# Patient Record
Sex: FEMALE | Race: WHITE | Employment: UNEMPLOYED | URBAN - METROPOLITAN AREA
[De-identification: names, ages, dates, MRNs, and addresses within clinical notes are randomized per-mention and may not be internally consistent; named-entity substitution may affect disease eponyms.]

---

## 2020-01-07 PROCEDURE — 99285 EMERGENCY DEPT VISIT HI MDM: CPT | Performed by: EMERGENCY MEDICINE

## 2020-04-07 LAB
APPEARANCE, URINE: CLEAR
BACTERIA, URINE: NORMAL
BILIRUBIN-URINE: ABNORMAL
COLOR, URINE: YELLOW
GLUCOSE URINE-UA: ABNORMAL
KETONE-URINE: ABNORMAL
NITRITE-URINE: ABNORMAL
NO CASTS, URINE: NORMAL
NO CRYSTALS, URINE: NORMAL
OCCULT BLOOD URINE: ABNORMAL
PH-URINE: 7 (ref 5–8)
PROTEIN-URINE-DIP: ABNORMAL
RBC-URINE: NORMAL /HPF (ref 0–2)
SPECIFIC GRAVITY-URINE: <=1.005 (ref 1.01–1.03)
SQUAMOUS EPITHELIAL CELL URINE: NORMAL /LPF (ref 0–2)
URINE LEUKOCYTE ESTERASE: NEGATIVE
UROBILINOGEN-URINE: ABNORMAL MG/DL (ref 0.2–1)
WBC-URINE: NORMAL /HPF (ref 0–2)

## 2024-06-26 ENCOUNTER — APPOINTMENT (OUTPATIENT)
Dept: ULTRASOUND IMAGING | Age: 29
End: 2024-06-26

## 2024-06-26 ENCOUNTER — HOSPITAL ENCOUNTER (EMERGENCY)
Age: 29
Discharge: HOME | End: 2024-06-26
Attending: EMERGENCY MEDICINE

## 2024-06-26 VITALS
RESPIRATION RATE: 16 BRPM | TEMPERATURE: 98 F | DIASTOLIC BLOOD PRESSURE: 72 MMHG | HEART RATE: 91 BPM | WEIGHT: 175 LBS | HEIGHT: 66 IN | BODY MASS INDEX: 28.13 KG/M2 | OXYGEN SATURATION: 100 % | SYSTOLIC BLOOD PRESSURE: 118 MMHG

## 2024-06-26 DIAGNOSIS — O20.0 THREATENED MISCARRIAGE (HCC): Primary | ICD-10-CM

## 2024-06-26 LAB
B-HCG SERPL-ACNC: ABNORMAL MIU/ML
BASOPHILS # BLD AUTO: 0.06 X10(3) UL (ref 0–0.2)
BASOPHILS NFR BLD AUTO: 0.5 %
EOSINOPHIL # BLD AUTO: 0.13 X10(3) UL (ref 0–0.7)
EOSINOPHIL NFR BLD AUTO: 1.2 %
ERYTHROCYTE [DISTWIDTH] IN BLOOD BY AUTOMATED COUNT: 12.3 %
HCT VFR BLD AUTO: 43.2 %
HGB BLD-MCNC: 14.8 G/DL
IMM GRANULOCYTES # BLD AUTO: 0.05 X10(3) UL (ref 0–1)
IMM GRANULOCYTES NFR BLD: 0.4 %
LYMPHOCYTES # BLD AUTO: 2.77 X10(3) UL (ref 1–4)
LYMPHOCYTES NFR BLD AUTO: 24.8 %
MCH RBC QN AUTO: 30.9 PG (ref 26–34)
MCHC RBC AUTO-ENTMCNC: 34.3 G/DL (ref 31–37)
MCV RBC AUTO: 90.2 FL
MONOCYTES # BLD AUTO: 0.64 X10(3) UL (ref 0.1–1)
MONOCYTES NFR BLD AUTO: 5.7 %
NEUTROPHILS # BLD AUTO: 7.54 X10 (3) UL (ref 1.5–7.7)
NEUTROPHILS # BLD AUTO: 7.54 X10(3) UL (ref 1.5–7.7)
NEUTROPHILS NFR BLD AUTO: 67.4 %
PLATELET # BLD AUTO: 277 10(3)UL (ref 150–450)
RBC # BLD AUTO: 4.79 X10(6)UL
RH BLOOD TYPE: POSITIVE
WBC # BLD AUTO: 11.2 X10(3) UL (ref 4–11)

## 2024-06-26 PROCEDURE — 86900 BLOOD TYPING SEROLOGIC ABO: CPT | Performed by: EMERGENCY MEDICINE

## 2024-06-26 PROCEDURE — 99284 EMERGENCY DEPT VISIT MOD MDM: CPT

## 2024-06-26 PROCEDURE — 36415 COLL VENOUS BLD VENIPUNCTURE: CPT

## 2024-06-26 PROCEDURE — 99285 EMERGENCY DEPT VISIT HI MDM: CPT

## 2024-06-26 PROCEDURE — 86901 BLOOD TYPING SEROLOGIC RH(D): CPT | Performed by: EMERGENCY MEDICINE

## 2024-06-26 PROCEDURE — 85025 COMPLETE CBC W/AUTO DIFF WBC: CPT | Performed by: EMERGENCY MEDICINE

## 2024-06-26 PROCEDURE — 76801 OB US < 14 WKS SINGLE FETUS: CPT | Performed by: EMERGENCY MEDICINE

## 2024-06-26 PROCEDURE — 84702 CHORIONIC GONADOTROPIN TEST: CPT | Performed by: EMERGENCY MEDICINE

## 2024-06-26 PROCEDURE — 76817 TRANSVAGINAL US OBSTETRIC: CPT | Performed by: EMERGENCY MEDICINE

## 2024-06-26 NOTE — ED INITIAL ASSESSMENT (HPI)
Pt states she just found out she is 5 weeks pregnant and has an IUD, but she cannot feel it. Denies cramping, vag bleeding.

## 2024-06-27 NOTE — DISCHARGE INSTRUCTIONS
Push fluids and rest  Follow-up closely with gynecology on-call or your hometown gynecologist.  Call tomorrow for an appointment to be seen    No douching, tampons, or intercourse.  No heavy physical activity  Start over-the-counter prenatal vitamin

## 2024-06-27 NOTE — ED PROVIDER NOTES
Patient Seen in: [facility] In [place]      History     Chief Complaint   Patient presents with    Eval-G     Stated Complaint: PREGNANCY WITH IUD    Subjective:   HPI    Patient is G3, P2.  Patient discovered she is pregnant.  She took a few pregnancy test at home.  She has an IUD placed 3 years ago.  No vaginal bleeding.  No abdominal pain.  No cramping.  She has no gynecologist in the area and went to a midwife clinic in [facility]  Patient was having 60-day cycles.  Last period was May 1 and flow was as expected.  She had in period in April which was much heavier.     Objective:   History reviewed. No pertinent past medical history.           History reviewed. No pertinent surgical history.             Social History     Socioeconomic History    Marital status:    Tobacco Use    Smoking status: Never    Smokeless tobacco: Never   Vaping Use    Vaping status: Never Used   Substance and Sexual Activity    Alcohol use: Not Currently    Drug use: Never              Review of Systems    Positive for stated Chief Complaint: Eval-G    Other systems are as noted in HPI.  Constitutional and vital signs reviewed.      All other systems reviewed and negative except as noted above.    Physical Exam     ED Triage Vitals [06/26/24 1807]   /75   Pulse 95   Resp 19   Temp 98.3 °F (36.8 °C)   Temp src    SpO2 98 %   O2 Device None (Room air)       Current Vitals:   Vital Signs  BP: 113/75  Pulse: 95  Resp: 19  Temp: 98.3 °F (36.8 °C)    Oxygen Therapy  SpO2: 98 %  O2 Device: None (Room air)            Physical Exam  General: The patient is awake, alert, conversant.  She appears in no distress  Eyes: sclera white.  Lids and lashes are normal.  Abdomen: Soft, nondistended.  Completely nontender even to palpation.  Definitely no guarding, rigidity, rebound  Extremities: Unremarkable.    Skin: Not particularly pale  Neurologic:  Mental status as above.  Patient moves all extremities with good  strength and coordination.           ED Course     Labs Reviewed   HCG, BETA SUBUNIT (QUANT PREGNANCY TEST) - Abnormal; Notable for the following components:       Result Value    Hcg Quantitative 15,877.0 (*)     All other components within normal limits   CBC W/ DIFFERENTIAL - Abnormal; Notable for the following components:    WBC 11.2 (*)     All other components within normal limits   CBC WITH DIFFERENTIAL WITH PLATELET    Narrative:     The following orders were created for panel order CBC With Differential With Platelet.  Procedure                               Abnormality         Status                     ---------                               -----------         ------                     CBC W/ DIFFERENTIAL[[id number]]          Abnormal            Final result                 Please view results for these tests on the individual orders.   ABORH (BLOOD TYPE)                      MDM      Patient with positive pregnancy test at home and an IUD.  Ectopic pregnancy included in the differential.    CBC shows normal hemoglobin  Beta-hCG 15,877  Blood type O+    Ultrasound pelvis  I personally reviewed the actual radiographs themselves and my individual interpretation shows IUP without cardiac activity  radiologist's formal interpretation which I have reviewed    CONCLUSION:    1. There is an intrauterine gestational sac with a yolk sac and somewhat amorphous appearing fetal pole.  No fetal heart tones are yet identified .  The estimated age is 6 week 3 day +/-4 days by crown-rump length measurement.  Although fetal cardiac  activity is not yet identified, viable intrauterine pregnancy is still possible given the early age.  Recommend quantitative serial beta HCG levels and follow-up sonogram in 1 week further evaluation.  2. Complex left ovarian cyst consistent with a complex corpus luteal cyst.  3. There is a small amount of free pelvic fluid in the cul-de-sac.  4. There is no IUD identified within the uterus.           Patient does not recall having the IUD removed.  As noted above, she had a very heavy period in April and she questions whether or not the IUD could have been expelled at some point.   I discussed case with gynecology on-call.  They can provide close follow-up.  Patient provided threatened miscarriage instructions and I discussed the importance of close follow-up with her as well as indications for return                             Medical Decision Making      Disposition and Plan     Clinical Impression:  1. Threatened miscarriage (HCC)         Disposition:  Discharge  6/26/2024  9:42 pm    Follow-up:  Sue Alicea MD, MD  [address]  [id number]  [facility] [address]  [phone]    Call in 1 day(s)  As needed          Medications Prescribed:  There are no discharge medications for this patient.

## 2025-01-13 ENCOUNTER — APPOINTMENT (OUTPATIENT)
Dept: ULTRASOUND IMAGING | Facility: HOSPITAL | Age: 30
End: 2025-01-13
Attending: OBSTETRICS & GYNECOLOGY
Payer: MEDICAID

## 2025-01-13 ENCOUNTER — HOSPITAL ENCOUNTER (OUTPATIENT)
Facility: HOSPITAL | Age: 30
Discharge: HOME | End: 2025-01-13
Attending: OBSTETRICS & GYNECOLOGY | Admitting: OBSTETRICS & GYNECOLOGY
Payer: MEDICAID

## 2025-01-13 VITALS
DIASTOLIC BLOOD PRESSURE: 78 MMHG | TEMPERATURE: 99 F | BODY MASS INDEX: 32.14 KG/M2 | HEIGHT: 66 IN | SYSTOLIC BLOOD PRESSURE: 118 MMHG | RESPIRATION RATE: 16 BRPM | WEIGHT: 200 LBS | HEART RATE: 96 BPM

## 2025-01-13 PROCEDURE — 76819 FETAL BIOPHYS PROFIL W/O NST: CPT | Performed by: OBSTETRICS & GYNECOLOGY

## 2025-01-13 PROCEDURE — 59025 FETAL NON-STRESS TEST: CPT

## 2025-01-13 PROCEDURE — 76818 FETAL BIOPHYS PROFILE W/NST: CPT | Performed by: OBSTETRICS & GYNECOLOGY

## 2025-01-13 PROCEDURE — 99213 OFFICE O/P EST LOW 20 MIN: CPT

## 2025-01-13 RX ORDER — CHOLECALCIFEROL (VITAMIN D3) 25 MCG
1 TABLET,CHEWABLE ORAL DAILY
COMMUNITY

## 2025-01-13 RX ORDER — MAGNESIUM 200 MG
200 TABLET ORAL NIGHTLY
COMMUNITY

## 2025-01-14 NOTE — PROGRESS NOTES
Pt is a 29 year old female admitted to TRG1/TRG1-A.     Chief Complaint   Patient presents with    Decreased Fetal Movement     Pt sees group of midwives in [place] movements today but less often and less frequency. Pt did \"fall\" earlier today. Said she was going to sit on the ground an plopped on the ground. Did not hit abdomen at all. Denies lof, bleeding, occasional ctx, FM marker given      Pt is  34w3d intra-uterine pregnancy.  History obtained, consents signed. Oriented to room, staff, and plan of care.

## 2025-01-14 NOTE — PROGRESS NOTES
Pt feeling more regular movements. Discharged to home per ambulatory in stable condition with written and verbal instructions. Pt to go to her scheudled appt with her OB tomorrow. Kick counts reviewed. Labor precautions reviewed. Patient verbalizes understanding of information given.

## 2025-01-14 NOTE — NST
Nonstress Test   Patient: Jonelle May    Gestation: 34w3d    NST:       Variability: Moderate           Accelerations: Yes           Decelerations: None            Baseline: 125 BPM           Uterine Irritability: No           Contractions: Irregular                                        Acoustic Stimulator: No           Nonstress Test Interpretation: Reactive           Nonstress Test Second Interpretation: Reactive                     Additional Comments

## 2025-01-18 NOTE — PROGRESS NOTES
NST reviewed - baseline 125 - moderate variability - large accels present - Reactive NST -   Ctx's present

## 2025-04-21 ENCOUNTER — ORDER TRANSCRIPTION (OUTPATIENT)
Dept: PHYSICAL THERAPY | Facility: HOSPITAL | Age: 30
End: 2025-04-21

## 2025-04-21 DIAGNOSIS — N39.3 STRESS INCONTINENCE: Primary | ICD-10-CM

## 2025-06-03 ENCOUNTER — OFFICE VISIT (OUTPATIENT)
Dept: PHYSICAL THERAPY | Age: 30
End: 2025-06-03
Payer: MEDICAID

## 2025-06-03 DIAGNOSIS — N39.3 STRESS INCONTINENCE: Primary | ICD-10-CM

## 2025-06-03 PROCEDURE — 97110 THERAPEUTIC EXERCISES: CPT

## 2025-06-03 PROCEDURE — 97161 PT EVAL LOW COMPLEX 20 MIN: CPT

## 2025-06-03 NOTE — PROGRESS NOTES
MUSCULOSKELETAL AND PELVIC FLOOR EVALUATION:     Diagnosis:   Stress incontinence (N39.3) Patient:  Jonelle May (29 year old, female)        Referring Provider: Physician Nonstaff  Today's Date   6/3/2025    Precautions:      Date of Evaluation: No data recorded  Next MD visit: No data recorded  Date of Surgery: No data recorded     PATIENT SUMMARY        History of current condition:  First child long push phase and had push phase. Second child pregnant 2 months,  Third  delivery:  short push phase ( 15 minutes)   Pain level: current 0/10 , at best0/10  , at worst  5/10 - after BM, intercourse, lifting  Description of symptoms:    Prolapsing with BM, laughing,    Occupation:   Homemaker, caring farm  Leisure activities/Hobbies:  Keeping up toddlers  ( 3 yo and 3 yo)  Prior level of function:    Current limitations:    squating, lifting, laughing, dyspareunia  Pt goals:   not experiencing prolapse and heaviness, better BM  URINARY HABITS  Types of symptoms:   Events associated with the onset of urinary complaints: Occasional leaking the weeks after birth.  Occasional urgency if wait after 4 hours  Abdominal/Vaginal Pressure complaints: yes    Urinary Frequency: 2-4   Pad use: no  Nocturia: 1    Fluid Intake: > 64 oz - currently breast feediing  Bladder irritants: coffee  Post void dribble: no  Empty bladder just in case: no  Do you ever leak urine without knowing it? no     BOWEL HABITS  Types of symptoms: No constipation with use of magnesium.   Taking magnesium to soften stool. First child and second child pregnancy constipation . Using breath and belly hard. Have to support perineum. Stool for bowel movement.  Frequency of bowel movements: 2 x  Stool consistency: Clarke Stool Scale: 4-5 and sometimes feel like some are still present  Do you strain with defecation: No   Laxative use: No    SEXUAL HEALTH STATUS    Sexual [place] Status: marital  Pain with initial and/or deep penetration: Deep  pentrations Condoms have caused burning for days. Several days of deep aching   Pain with pelvic exam/tampon use: no - because of title uterus    Pregnant Now:     OB History    Para Term  AB Living   3 2 2 0 0 2   SAB IAB Ectopic Multiple Live Births   0 0 0 0 2      # Outcome Date GA Lbr Obed/2nd Weight Sex Type Anes PTL Lv   3             2 Term 21 37w1d   M NORMAL SPONT None  LASHAWN   1 Term 20 38w5d   M NORMAL SPONT None  LASHAWN     Urodynamic Test:     Manometry:     PFDI 20    Scores   POPDI 6:    54.17   CRAD 8:    46.88   JOSIAS 6:    37.5   Summary:    138.55      Outpatient Therapy Pelvic Health Intake       Question 6/3/2025  5:14 PM CDT - Filed by Patient    Do you usually experience pressure in the lower abdomen? Somewhat    Do you usually experience heaviness or dullness in the pelvic area? Somewhat    Do you usually have a bulge or something falling out that you can see or feel in your vaginal area? Moderately    Do you ever have to push on the vagina or around the rectum to have or complete a bowel movement? Quite a bit    Do you usually experience a feeling of incomplete bladder emptying? Not at all    Do you ever have to push up on a bulge in the vaginal area with your fingers to start or complete urination? Not at all    Please provide details on the following urinary history / complaints     Frequency of urination: # of times/day 5    Frequency of urination: # of times/night 2    When you have the urge to urinate, how long can you delay before you have to go to the toilet? (# of minutes or hours) an hour or so if needed    Do you have a history of urinary tract infections: No    Do you have a history of urine loss (select all that apply)       How many times a day does leakage occur?       If you have leakage, what type(s) of protective device(s) do you use? (Select all that apply)       On average, how many pads do you use each day?       Do you soak the pad fully?        Do you change the pad each time it is wet?       Do you experience any of these symptoms? (Select all that apply) Experience an urge to urinate when you hear running water    Do you usually experience frequent urination? Somewhat    Do you usually experience urine leakage associated with a feeling of urgency, that is, a strong sensation of needing to go to the bathroom? Not at all    Do you usually experience urine leakage related to coughing, sneezing, or laughing? Somewhat    Do you usually experience small amounts of urine leakage (that is, drops)? Not at all    Do you usually experience difficulty empyting your bladder? Not at all    Do you usually experience pain or discomfort in the lower abdomen or genital region? Somewhat    Have you ever had any of the following treatment:     Have you ever done exercises to control urine loss? If so, for how long?       Has your doctor ever prescribed any medication for urine loss? No    Have you had any surgical procedures to treat urine loss? No    Please provide details on the following bowel history / complaints.     How many bowel movements do you have per day? 2    How many bowel movements do you have per night? none    Do you experience diarrhea? If yes, how often? daily    Do you feel you need to strain too hard to have a bowel movement? Quite a bit    Do you feel you have not completely emptied your bowels at the end of a bowel movement? Somewhat    Do you usually lose stool beyond your control if your stool is well formed? Not at all    Do you usually lose stool beyond your control if your stool is loose? Somewhat    Do you usually lose gas from the rectum beyond your control? Not at all    Do you usually have pain when you pass your stool? Not at all    Do you experience strong sense of urgency and have to rush to bathroom for bowel movement? Somewhat    Does part of bowel ever pass through rectum and bulge outside during/after bowel movement? Somewhat    Please  provide details on your daily fluid/food intake.     On average, what is your daily fluid intake (1 glass=8ounces)? (# of glasses per day) 6    How many are caffeinated? (# of glasses per day) 1    Do you restrict fluids because of incontenence (leakage)? No    Do you include fiber in your diet (fruits, vegetables, bran, etc.)? Yes    Psychosocial information     Do you live alone? No    Which recreational activities do you participate in if any? toddlers    Have you had to restrict your activities due to your pelvic health concerns? Yes    On a scale of 0 (no impairments) to 10 (severe impairments), what are your feelings about your urinary or bowel incontinence or pelvic pain? 7    Do you have pain with intercourse? Yes    Have you had any changes in intimate relationships/sexual function due to your symptoms?  Yes    Your personal safety is of utmost importance to us at [facility], please answer the following questions:     Are you being hurt, frightened, demeaned, or taken advantage of by anyone at your home or in your life?  No    Have you recently had thoughts of hurting yourself? No    Have you tried to hurt yourself in the past?  No    Pelvic Organ Prolapse Distress Inventory 6 Score (range: 0 - 100) 54.17    Colorectal-Anal Distress Inventory 8 Score (range: 0 - 100) 46.88    Urinary Distress Inventory 6 Score (range: 0 - 100) 37.5    PFDI Summary Score (range: 0 - 300) 138.55            Sexual Health Status  Marinoff Scale:     History of sexual abuse:     Sexual intercourse status:       Past medical history was reviewed with Jonelle.  Significant findings include:    Imaging/Tests:     Jonelle  has a past medical history of Depression.  She  has no past surgical history on file.    ASSESSMENT  Jonelle presents to physical therapy evaluation with primary c/o  deep aching, prolapse, lack perineal support with BM, pain after BM and dyspareunia. Stress UI has improved since MD visit. The  results of the objective tests and measures show pelvic floor tightness and pelvic floor weakness with limitaiton coordination with functional movements. Deep ache and perception of pressure may also be coming from pelvic floor tension as minimal cystocele present in supine during exam today. Functional deficits include but are not limited to  . Signs and symptoms are consistent with diagnosis of Stress incontinence (N39.3). Pt and PT discussed evaluation findings, pathology, POC and HEP.  Pt voiced understanding and performs HEP correctly without reported pain. Skilled Physical Therapy is medically necessary to address the above impairments and reach functional goals.    OBJECTIVE:   Posture: good  Pelvic Alignment: equal  Deep Tendon Reflexes: not applicable  Gait: pt ambulates on level ground with normal mechanics.         Informed consent for internal pelvic evaluation given: Yes    External Observation:   Voluntary contraction: present   Voluntary relaxation: present  Involuntary contraction: present  Involuntary relaxation: present    Mons pubis: WNL  Labia majora: WNL  Labia minora: WNL  Urethral meatus: WNL  Introitus: WNL  Perineal body: other: laxity    Sensory/Reflex:  Vestibule: normal bilaterally  Anal Sharon: normal bilaterally    Internal Examination   Scar: not applicable    Pelvic Floor Muscle strength: (PERF= Power/Endurance/Reps/Fast) MMT: 4/7/5/not tested  External Anal Sphincter: not tested  Accessory Muscle Use: none    Tissue Laxity Test:  Anterior Wall: Min  Posterior Wall: WNL  Apical: WNL    Internal Palpation: WNL except significant tension in deep pelvic floor diaphragm 900 and 300    Today's Treatment and Response:   Patient education was provided on objective findings of external and internal evaluation and expectations with treatment outcomes. Educated on pelvic anatomy and function with diagrams and pelvic model, bladder normatives, and adequate hydration levels    Patient was  instructed in and issued a HEP for: diet/bladder/bowel diary     Charges: PT Eval Low Complexity, Ex      Total Timed Treatment: 50 min     Total Treatment Time: 50 min      Today's Treatment and Response:   Pt education was provided on exam findings, treatment diagnosis, treatment plan, expectations, and prognosis.  Today's Treatment       6/3/2025   Pelvic Treatment   Therapeutic Exercise Education: BM with breathing and belly hard. Trial position posterior tilt  Pelvic floor anatomy, function and neurological voluntary control     Therapeutic Exercise Minutes 15   Evaluation Minutes 30   Total Time Of Timed Procedures 15   Total Time Of Service-Based Procedures 30   Total Treatment Time 45   HEP Pelvic floor isometric 7 sec x 10        Patient was instructed in and issued a HEP for: Pelvic floor isometric 7 sec x 10    Charges:  PT EVAL:  , TE  In agreement with evaluation findings and clinical rationale, this evaluation involved LOW COMPLEXITY decision making due to no personal factors/comorbidities, 1-2 body structures involved/activity limitations, and stable symptoms as documented in the evaluation.                                                                       PLAN OF CARE:    Goals: (to be met in  )   10 visits  Patient will demonstrate independence in performing home exercise program.  Patient will demonstrate pelvic contraction with lift for 10 sec and endurance to perform for 10 reps to improve prolapse support.   Patient will report no pain more than 1-2 min after BM.   Patient will report no dyspareunia majority of the time.   Patient will report no post intercourse pain majority of the time.   Patient will demonstrate normal pelvic flexibility to reduce deep ache and sensation of prolapse     Frequency / Duration: Patient will be seen 1x/week or a total of 10  visits over a 90 day period. Treatment will include: Neuromuscular Re-education; Therapeutic Exercise; Home Exercise Program  instruction    Education or treatment limitation: None   Rehab Potential: good         Patient/Family/Caregiver was advised of these findings, precautions, and treatment options and has agreed to actively participate in planning and for this course of care.    Thank you for your referral. Please co-sign or sign and return this letter via fax as soon as possible to [phone]. If you have any questions, please contact me at Dept: [phone]    Sincerely,  Electronically signed by therapist: Conchis Stein, PT  Physician's certification required: Yes  I certify the need for these services furnished under this plan of treatment and while under my care.    X___________________________________________________ Date____________________    Certification From: 6/3/2025  To:9/1/2025

## 2025-06-10 ENCOUNTER — OFFICE VISIT (OUTPATIENT)
Dept: PHYSICAL THERAPY | Age: 30
End: 2025-06-10
Payer: MEDICAID

## 2025-06-10 ENCOUNTER — TELEPHONE (OUTPATIENT)
Dept: PHYSICAL THERAPY | Facility: HOSPITAL | Age: 30
End: 2025-06-10

## 2025-06-10 PROCEDURE — 97110 THERAPEUTIC EXERCISES: CPT

## 2025-06-10 NOTE — PROGRESS NOTES
Patient: Jonelle May (29 year old, female) Referring Provider:  Insurance:   Diagnosis: Stress incontinence (N39.3) Physician Nonstaff  [employer]   Date of Surgery: No data recorded Next MD visit:  N/A   Precautions:    No data recorded Referral Information:    Date of Evaluation: Req: 4, Auth: 4, Exp: 6/3/2026    No data recorded POC Auth Visits:           Today's Date   6/10/2025    Subjective  Patient shared she realized she forgot to share at eval about having significant tail bone pain. SL for feeding due to pain. Back pain increased and saw chiropractor for the first time. It helped for temporary relief. Pain worse lying down. \"Is this connected and related to pelvic floor? Stopped pelvic floor exercise because felt like tension after. Pt thinks she was squeezing to strong. Use [employer] juan to help improve DR from last pregnancy. Question:can i start this       Pain: -- (0-5)     Objective  R anterior pelvic floor greatest restrictions. Full release on R with remaining tightness at deepest layer of pelvic floor.. Education on pelvic floor light contraction like a sweater. Recommend shorter time frame for contraction. Release for 3-5 counts. Review DR juan. Recommend wait to next week and address stretching this week. Education on use of coccyx wedge and where to order.            Assessment  Significant pelvic floor releases today. Responded to external and internal stretching. Patient to focus on stretching this week. Patient may benefit from massage at chiropractor office. Recommend coccyx wedge for lumbar, coccyx and pelvic floor.    Goals (to be met in  )   10 visits  Patient will demonstrate independence in performing home exercise program.  Patient will demonstrate pelvic contraction with lift for 10 sec and endurance to perform for 10 reps to improve prolapse support.   Patient will report no pain more than 1-2 min after BM.   Patient will report no dyspareunia majority of the time.    Patient will report no post intercourse pain majority of the time.   Patient will demonstrate normal pelvic flexibility to reduce deep ache and sensation of prolapse         Plan  Next session, address STM lumbar and coccyx, test DR, address pelvic floor with  BKFO, table top, bridges and clams, 4 point to help with  Re-evaluation with recommendations for additoinal visits.    Treatment Last 4 Visits  Treatment Day: 2       6/3/2025 6/10/2025   Pelvic Treatment   Therapeutic Exercise Education: BM with breathing and belly hard. Trial position posterior tilt  Pelvic floor anatomy, function and neurological voluntary control   Cross leg stretches 30 sec x 5  Cross over stretch 30 sec x 4  IO/MFR  4 -5 releases B  Internal stretching  900 -1100 at hip ER  PF education on lighter and short contraction with 3-4 second release  Coccyx wedge education     Therapeutic Exercise Minutes 15 55   Evaluation Minutes 30    Total Time Of Timed Procedures 15 55   Total Time Of Service-Based Procedures 30 0   Total Treatment Time 45 55   HEP Pelvic floor isometric 7 sec x 10 Cross leg stretch 30 sec x 4  Cross over stretch 30 sec x 4        HEP  Cross leg stretch 30 sec x 4  Cross over stretch 30 sec x 4    Charges  TE4

## 2025-06-17 ENCOUNTER — OFFICE VISIT (OUTPATIENT)
Dept: PHYSICAL THERAPY | Age: 30
End: 2025-06-17
Payer: MEDICAID

## 2025-06-17 PROCEDURE — 97110 THERAPEUTIC EXERCISES: CPT

## 2025-06-17 PROCEDURE — 97140 MANUAL THERAPY 1/> REGIONS: CPT

## 2025-07-08 ENCOUNTER — OFFICE VISIT (OUTPATIENT)
Dept: PHYSICAL THERAPY | Age: 30
End: 2025-07-08
Payer: MEDICAID

## 2025-07-08 PROCEDURE — 97110 THERAPEUTIC EXERCISES: CPT

## 2025-07-08 NOTE — PROGRESS NOTES
Patient: Jonelle May (29 year old, female) Referring Provider:  Insurance:   Diagnosis: Stress incontinence (N39.3) Physician [place] [employer]   Date of Surgery: No data recorded Next MD visit:  N/A   Precautions:    No data recorded Referral Information:    Date of Evaluation: Req: 4, Auth: 4, Exp: 6/3/2026    No data recorded POC Auth Visits:           Today's Date   7/8/2025    Subjective  Pelvic floor is aggravated after having GI bug  with 8 days of diarrhea. Ended 7/6. No symptoms since 7/6 but feel fatigued. Heaviness and feel like gave birth.       Pain: 0/10     Objective  Hold plan for internal pelvic floor stretching due to skin irritation from diarrhea. Patient is able to hold pelvic floor for 10 sec over 10 reps.            Assessment  Patient has achieved 1 of 6 goals. Patient has made progress toward back and coccyx pain from initial visits to work toward reducing dyspareunia and reducing pain with BM.  Progress toward goals requiring internal stretching was slowed with severe GI infection that prohibited internal stretching and attendance with PT.  Recommend continuing toward original recommendation of 10  visits to address internal stretching to meet pelvic floor goals for dyspareunia and BM.    Goals (to be met in  )   10 visits  Patient will demonstrate independence in performing home exercise program.  Patient will demonstrate pelvic contraction with lift for 10 sec and endurance to perform for 10 reps to improve prolapse support. MET  Patient will report no pain more than 1-2 min after BM.   Patient will report no dyspareunia majority of the time.   Patient will report no post intercourse pain majority of the time.   Patient will demonstrate normal pelvic flexibility to reduce deep ache and sensation of prolapse     Plan  Continue PT for pelvic floor external and internal stretching and progression of core and pelvic floor strength.    Treatment Last 4 Visits  Treatment Day: 3        6/3/2025 6/10/2025 6/17/2025   Pelvic Treatment   Therapeutic Exercise Education: BM with breathing and belly hard. Trial position posterior tilt  Pelvic floor anatomy, function and neurological voluntary control   Cross leg stretches 30 sec x 5  Cross over stretch 30 sec x 4  IO/MFR  4 -5 releases B  Internal stretching  900 -1100 at hip ER  PF education on lighter and short contraction with 3-4 second release  Coccyx wedge education   Prone extension forearms 60 sec x 3  Prone press up 5 sec hold 10 reps 2 sets  SL clams with TrA and multifidus first set of 10 reps and 2nd pelvic floor  SL open book with TrA x 10 first set and then PF x 10 second set   Manual Therapy   STM lumbar p/s, LDL, piriformis, ST ligament coccyx border   Therapeutic Exercise Minutes 15 55 25   Manual Therapy Minutes   25   Evaluation Minutes 30     Total Time Of Timed Procedures 15 55 50   Total Time Of Service-Based Procedures 30 0 0   Total Treatment Time 45 55 50   HEP Pelvic floor isometric 7 sec x 10 Cross leg stretch 30 sec x 4  Cross over stretch 30 sec x 4 Prone extension forearms 60 sec x 3  Prone press up 5 sec hold 10 reps 2 sets  SL clams with TrA and multifidus first set of 10 reps and 2nd pelvic floor  SL open book with TrA x 10 first set and then PF x 10 second set    Issue HO        HEP  Prone extension forearms 60 sec x 3  Prone press up 5 sec hold 10 reps 2 sets  SL clams with TrA and multifidus first set of 10 reps and 2nd pelvic floor  SL open book with TrA x 10 first set and then PF x 10 second set    Issue HO    Charges

## 2025-07-15 ENCOUNTER — APPOINTMENT (OUTPATIENT)
Dept: PHYSICAL THERAPY | Age: 30
End: 2025-07-15
Payer: MEDICAID

## 2025-07-22 ENCOUNTER — OFFICE VISIT (OUTPATIENT)
Dept: PHYSICAL THERAPY | Age: 30
End: 2025-07-22
Payer: MEDICAID

## 2025-07-22 PROCEDURE — 97110 THERAPEUTIC EXERCISES: CPT

## 2025-07-22 NOTE — PROGRESS NOTES
Patient: Jonelle May (29 year old, female) Referring Provider:  Insurance:   Diagnosis: Stress incontinence (N39.3) Physician Nonstaff  [employer]   Date of Surgery: No data recorded Next MD visit:  N/A   Precautions:    No data recorded Referral Information:    Date of Evaluation: Req: 4, Auth: 4, Exp: 8/2/2026    No data recorded POC Auth Visits:           Today's Date   7/22/2025    Subjective  Did exercises while gone. Forgot band  so this it the only one had to start when got home. Less dyspareunia.       Pain: 0/10     Objective  Tightness R 900-1100 and L levator ani. Significant improvement in pelvic flexibility today with internal stretching. Remaining tightness located at deeper 1000.            Assessment  Patient responding well to PT with less dyspareunia, increase PF flexibility, and increase pelvic floor coordination with functional movements. Patient has achieved 1 of 6 goals and progressing well toward remaining goals.    Goals (to be met in  )   10 visits  Patient will demonstrate independence in performing home exercise program.  Patient will demonstrate pelvic contraction with lift for 10 sec and endurance to perform for 10 reps to improve prolapse support.   Patient will report no pain more than 1-2 min after BM.   Patient will report no dyspareunia majority of the time.   Patient will report no post intercourse pain majority of the time. MET  Patient will demonstrate normal pelvic flexibility to reduce deep ache and sensation of prolapse - improved     Plan  Continue PT with R anterior PF and progress to carlitos mao    Treatment Last 4 Visits  Treatment Day: 4       6/10/2025 6/17/2025 7/8/2025 7/22/2025   Pelvic Treatment   Therapeutic Exercise Cross leg stretches 30 sec x 5  Cross over stretch 30 sec x 4  IO/MFR  4 -5 releases B  Internal stretching  900 -1100 at hip ER  PF education on lighter and short contraction with 3-4 second release  Coccyx wedge education   Prone  extension forearms 60 sec x 3  Prone press up 5 sec hold 10 reps 2 sets  SL clams with TrA and multifidus first set of 10 reps and 2nd pelvic floor  SL open book with TrA x 10 first set and then PF x 10 second set PF pre-activation:  BKFO alt x 10  Table top alt x 10  Sit to stand x 10  Squat x 10  Standing shoulder ext x 10 2 sets bilateral sides  Standing shoulder rows x 10 2 sets bilateral sides  PT re-evaluation Cross leg stretch 30 sec x 3 on both  Internal stretching -1100 and L levator ani add manuela ER  PF pre-activation:    squat with arm raises x 10  Standing GTB:  Hip abd x 2 sets of 10  Hip ext x 2 sets of 10   Side stepping 3 laps 3-4 step     Manual Therapy  STM lumbar p/s, LDL, piriformis, ST ligament coccyx border     Therapeutic Exercise Minutes 55 25 53 53   Manual Therapy Minutes  25     Total Time Of Timed Procedures 55 50 53 53   Total Time Of Service-Based Procedures 0 0 0 0   Total Treatment Time 55 50 53 53   HEP Cross leg stretch 30 sec x 4  Cross over stretch 30 sec x 4 Prone extension forearms 60 sec x 3  Prone press up 5 sec hold 10 reps 2 sets  SL clams with TrA and multifidus first set of 10 reps and 2nd pelvic floor  SL open book with TrA x 10 first set and then PF x 10 second set    Issue HO See above/HO issued  squat with arm raises x 10  Standing GTB:  Hip abd x 2 sets of 10  Hip ext x 2 sets of 10   Side stepping 3 laps 3-4 step        HEP   squat with arm raises x 10  Standing GTB:  Hip abd x 2 sets of 10  Hip ext x 2 sets of 10   Side stepping 3 laps 3-4 step    Charges  [id number]

## 2025-07-29 ENCOUNTER — OFFICE VISIT (OUTPATIENT)
Dept: PHYSICAL THERAPY | Age: 30
End: 2025-07-29
Payer: MEDICAID

## 2025-07-29 ENCOUNTER — APPOINTMENT (OUTPATIENT)
Dept: PHYSICAL THERAPY | Age: 30
End: 2025-07-29
Payer: MEDICAID

## 2025-07-29 PROCEDURE — 97110 THERAPEUTIC EXERCISES: CPT

## 2025-08-05 ENCOUNTER — OFFICE VISIT (OUTPATIENT)
Dept: PHYSICAL THERAPY | Age: 30
End: 2025-08-05

## 2025-08-05 PROCEDURE — 97110 THERAPEUTIC EXERCISES: CPT

## 2025-08-19 ENCOUNTER — APPOINTMENT (OUTPATIENT)
Dept: PHYSICAL THERAPY | Age: 30
End: 2025-08-19